# Patient Record
Sex: MALE | Race: ASIAN | NOT HISPANIC OR LATINO | URBAN - METROPOLITAN AREA
[De-identification: names, ages, dates, MRNs, and addresses within clinical notes are randomized per-mention and may not be internally consistent; named-entity substitution may affect disease eponyms.]

---

## 2022-01-05 ENCOUNTER — EMERGENCY (EMERGENCY)
Facility: HOSPITAL | Age: 62
LOS: 1 days | Discharge: HOME | End: 2022-01-05
Attending: EMERGENCY MEDICINE
Payer: COMMERCIAL

## 2022-01-05 VITALS
WEIGHT: 173.06 LBS | HEART RATE: 98 BPM | RESPIRATION RATE: 19 BRPM | SYSTOLIC BLOOD PRESSURE: 162 MMHG | OXYGEN SATURATION: 98 % | TEMPERATURE: 98 F | HEIGHT: 70 IN | DIASTOLIC BLOOD PRESSURE: 92 MMHG

## 2022-01-05 VITALS
OXYGEN SATURATION: 97 % | DIASTOLIC BLOOD PRESSURE: 76 MMHG | TEMPERATURE: 99 F | HEART RATE: 96 BPM | SYSTOLIC BLOOD PRESSURE: 122 MMHG | RESPIRATION RATE: 17 BRPM

## 2022-01-05 LAB
ALBUMIN SERPL ELPH-MCNC: 4.2 G/DL — SIGNIFICANT CHANGE UP (ref 3.5–5)
ALP SERPL-CCNC: 60 U/L — SIGNIFICANT CHANGE UP (ref 40–120)
ALT FLD-CCNC: 27 U/L DA — SIGNIFICANT CHANGE UP (ref 10–60)
ANION GAP SERPL CALC-SCNC: 6 MMOL/L — SIGNIFICANT CHANGE UP (ref 5–17)
APPEARANCE UR: CLEAR — SIGNIFICANT CHANGE UP
APTT BLD: 40 SEC — HIGH (ref 27.5–35.5)
AST SERPL-CCNC: 19 U/L — SIGNIFICANT CHANGE UP (ref 10–40)
BASOPHILS # BLD AUTO: 0.04 K/UL — SIGNIFICANT CHANGE UP (ref 0–0.2)
BASOPHILS NFR BLD AUTO: 0.3 % — SIGNIFICANT CHANGE UP (ref 0–2)
BILIRUB SERPL-MCNC: 0.6 MG/DL — SIGNIFICANT CHANGE UP (ref 0.2–1.2)
BILIRUB UR-MCNC: NEGATIVE — SIGNIFICANT CHANGE UP
BUN SERPL-MCNC: 14 MG/DL — SIGNIFICANT CHANGE UP (ref 7–18)
CALCIUM SERPL-MCNC: 8.9 MG/DL — SIGNIFICANT CHANGE UP (ref 8.4–10.5)
CHLORIDE SERPL-SCNC: 104 MMOL/L — SIGNIFICANT CHANGE UP (ref 96–108)
CO2 SERPL-SCNC: 29 MMOL/L — SIGNIFICANT CHANGE UP (ref 22–31)
COLOR SPEC: YELLOW — SIGNIFICANT CHANGE UP
CREAT SERPL-MCNC: 0.92 MG/DL — SIGNIFICANT CHANGE UP (ref 0.5–1.3)
DIFF PNL FLD: ABNORMAL
EOSINOPHIL # BLD AUTO: 0.01 K/UL — SIGNIFICANT CHANGE UP (ref 0–0.5)
EOSINOPHIL NFR BLD AUTO: 0.1 % — SIGNIFICANT CHANGE UP (ref 0–6)
GLUCOSE SERPL-MCNC: 135 MG/DL — HIGH (ref 70–99)
GLUCOSE UR QL: NEGATIVE — SIGNIFICANT CHANGE UP
HCT VFR BLD CALC: 47.1 % — SIGNIFICANT CHANGE UP (ref 39–50)
HGB BLD-MCNC: 15.3 G/DL — SIGNIFICANT CHANGE UP (ref 13–17)
IMM GRANULOCYTES NFR BLD AUTO: 0.4 % — SIGNIFICANT CHANGE UP (ref 0–1.5)
INR BLD: 1.81 RATIO — HIGH (ref 0.88–1.16)
KETONES UR-MCNC: NEGATIVE — SIGNIFICANT CHANGE UP
LACTATE SERPL-SCNC: 2 MMOL/L — SIGNIFICANT CHANGE UP (ref 0.7–2)
LEUKOCYTE ESTERASE UR-ACNC: NEGATIVE — SIGNIFICANT CHANGE UP
LIDOCAIN IGE QN: 74 U/L — SIGNIFICANT CHANGE UP (ref 73–393)
LYMPHOCYTES # BLD AUTO: 0.56 K/UL — LOW (ref 1–3.3)
LYMPHOCYTES # BLD AUTO: 4 % — LOW (ref 13–44)
MCHC RBC-ENTMCNC: 32.5 GM/DL — SIGNIFICANT CHANGE UP (ref 32–36)
MCHC RBC-ENTMCNC: 32.6 PG — SIGNIFICANT CHANGE UP (ref 27–34)
MCV RBC AUTO: 100.4 FL — HIGH (ref 80–100)
MONOCYTES # BLD AUTO: 0.51 K/UL — SIGNIFICANT CHANGE UP (ref 0–0.9)
MONOCYTES NFR BLD AUTO: 3.7 % — SIGNIFICANT CHANGE UP (ref 2–14)
NEUTROPHILS # BLD AUTO: 12.76 K/UL — HIGH (ref 1.8–7.4)
NEUTROPHILS NFR BLD AUTO: 91.5 % — HIGH (ref 43–77)
NITRITE UR-MCNC: NEGATIVE — SIGNIFICANT CHANGE UP
NRBC # BLD: 0 /100 WBCS — SIGNIFICANT CHANGE UP (ref 0–0)
PH UR: 5 — SIGNIFICANT CHANGE UP (ref 5–8)
PLATELET # BLD AUTO: 257 K/UL — SIGNIFICANT CHANGE UP (ref 150–400)
POTASSIUM SERPL-MCNC: 3.7 MMOL/L — SIGNIFICANT CHANGE UP (ref 3.5–5.3)
POTASSIUM SERPL-SCNC: 3.7 MMOL/L — SIGNIFICANT CHANGE UP (ref 3.5–5.3)
PROT SERPL-MCNC: 7.9 G/DL — SIGNIFICANT CHANGE UP (ref 6–8.3)
PROT UR-MCNC: 30 MG/DL
PROTHROM AB SERPL-ACNC: 20.9 SEC — HIGH (ref 10.6–13.6)
RBC # BLD: 4.69 M/UL — SIGNIFICANT CHANGE UP (ref 4.2–5.8)
RBC # FLD: 11.3 % — SIGNIFICANT CHANGE UP (ref 10.3–14.5)
SARS-COV-2 RNA SPEC QL NAA+PROBE: SIGNIFICANT CHANGE UP
SODIUM SERPL-SCNC: 139 MMOL/L — SIGNIFICANT CHANGE UP (ref 135–145)
SP GR SPEC: 1.03 — HIGH (ref 1.01–1.02)
UROBILINOGEN FLD QL: NEGATIVE — SIGNIFICANT CHANGE UP
WBC # BLD: 13.94 K/UL — HIGH (ref 3.8–10.5)
WBC # FLD AUTO: 13.94 K/UL — HIGH (ref 3.8–10.5)

## 2022-01-05 PROCEDURE — 74177 CT ABD & PELVIS W/CONTRAST: CPT | Mod: 26,MA

## 2022-01-05 PROCEDURE — 36415 COLL VENOUS BLD VENIPUNCTURE: CPT

## 2022-01-05 PROCEDURE — 83605 ASSAY OF LACTIC ACID: CPT

## 2022-01-05 PROCEDURE — 99285 EMERGENCY DEPT VISIT HI MDM: CPT

## 2022-01-05 PROCEDURE — 81001 URINALYSIS AUTO W/SCOPE: CPT

## 2022-01-05 PROCEDURE — 85610 PROTHROMBIN TIME: CPT

## 2022-01-05 PROCEDURE — 74177 CT ABD & PELVIS W/CONTRAST: CPT | Mod: MA

## 2022-01-05 PROCEDURE — 93010 ELECTROCARDIOGRAM REPORT: CPT

## 2022-01-05 PROCEDURE — 87635 SARS-COV-2 COVID-19 AMP PRB: CPT

## 2022-01-05 PROCEDURE — 96374 THER/PROPH/DIAG INJ IV PUSH: CPT | Mod: XU

## 2022-01-05 PROCEDURE — 99284 EMERGENCY DEPT VISIT MOD MDM: CPT | Mod: 25

## 2022-01-05 PROCEDURE — 74019 RADEX ABDOMEN 2 VIEWS: CPT

## 2022-01-05 PROCEDURE — 85730 THROMBOPLASTIN TIME PARTIAL: CPT

## 2022-01-05 PROCEDURE — 83690 ASSAY OF LIPASE: CPT

## 2022-01-05 PROCEDURE — 85025 COMPLETE CBC W/AUTO DIFF WBC: CPT

## 2022-01-05 PROCEDURE — 74019 RADEX ABDOMEN 2 VIEWS: CPT | Mod: 26

## 2022-01-05 PROCEDURE — 80053 COMPREHEN METABOLIC PANEL: CPT

## 2022-01-05 PROCEDURE — 87086 URINE CULTURE/COLONY COUNT: CPT

## 2022-01-05 PROCEDURE — 93005 ELECTROCARDIOGRAM TRACING: CPT

## 2022-01-05 RX ORDER — SODIUM CHLORIDE 9 MG/ML
1000 INJECTION INTRAMUSCULAR; INTRAVENOUS; SUBCUTANEOUS ONCE
Refills: 0 | Status: COMPLETED | OUTPATIENT
Start: 2022-01-05 | End: 2022-01-05

## 2022-01-05 RX ORDER — MORPHINE SULFATE 50 MG/1
2 CAPSULE, EXTENDED RELEASE ORAL ONCE
Refills: 0 | Status: DISCONTINUED | OUTPATIENT
Start: 2022-01-05 | End: 2022-01-05

## 2022-01-05 RX ADMIN — MORPHINE SULFATE 2 MILLIGRAM(S): 50 CAPSULE, EXTENDED RELEASE ORAL at 05:52

## 2022-01-05 RX ADMIN — SODIUM CHLORIDE 1000 MILLILITER(S): 9 INJECTION INTRAMUSCULAR; INTRAVENOUS; SUBCUTANEOUS at 05:52

## 2022-01-05 NOTE — CONSULT NOTE ADULT - ASSESSMENT
61M with AF (on Multaq, Atenolol and Coumadin), with diffusely dilated fluid filled loops of bowel with multiple transition points on non-con CT  pt denies N/V and is passing flatus this AM; no prior surgeries; on physical examination soft, nontender, no guarding, no rebound  CT scan limited value given without oral contrast    - No surgical intervention warranted during this time given clinically low suspicion of SBO  - Consider CT scan with oral contrast for better evaluation of small bowel  - Recommend GI consult  - Discussed with Dr. Abreu, agrees

## 2022-01-05 NOTE — ED ADULT NURSE NOTE - CHIEF COMPLAINT
Stage 14: Additional Anesthesia Type: 1% lidocaine with epinephrine The patient is a 61y Male complaining of abdominal pain.

## 2022-01-05 NOTE — ED PROVIDER NOTE - NSFOLLOWUPINSTRUCTIONS_ED_ALL_ED_FT
You were seen in the emergency department for: abdominal pain  Your results report is attached. Please follow up in GI clinic with Dr. Spain tomorrow morning - call [phone] - the clinic is located at [address] [place] [address].    You may be contacted by the Emergency Department Referrals Coordinator to set up your follow-up appointment within 24-48 hours of your discharge, Monday to Friday. We recommend you follow up with: Gastroenterology    Please return to the Emergency Department if you experience any of the following symptoms:   - Shortness of breath or trouble breathing  - Pressure, pain or tightness in the chest  - Face drooping, arm weakness or speech difficulty  - Persistence of severe vomiting  - Head injury or loss of consciousness  - Nonstop bleeding or an open wound    (1) Follow up with your primary care physician within the next 24-48 hours as discussed. In addition, we did not find evidence of a life threatening illness on your testing here today, but listed below are the specialists that will be necessary to see as an outpatient to continue the workup.  Please call the numbers listed below or [phone] to set up the necessary appointments.  (2) Take Tylenol (up to 1000mg or 1 g)  and/or Motrin (up to 600mg) up to every 6 hours as needed for pain.   (3) If you had an IV (intravenous) line placed, it was removed. Sometimes, after IV removal, that area can be tender for a few days; if it develops redness and swelling, those could be signs of infection; in which case, return to the Emergency Department for assessment.  (4) Please continue taking all of your home medications as directed.

## 2022-01-05 NOTE — ED PROVIDER NOTE - CLINICAL SUMMARY MEDICAL DECISION MAKING FREE TEXT BOX
60yo M with hx Afib on coumadin, HTN, BPH presents with abdominal pain. Will obtain ekg, labs, CT A/P. Given IVF and morphine. Will reassess.  patient signedout to Dr. Howe at 730am.

## 2022-01-05 NOTE — ED PROVIDER NOTE - PATIENT PORTAL LINK FT
You can access the FollowMyHealth Patient Portal offered by [facility] by registering at the following website: [email or web address]. By joining [employer]’s FollowMyHealth portal, you will also be able to view your health information using other applications (apps) compatible with our system.

## 2022-01-05 NOTE — CONSULT NOTE ADULT - SUBJECTIVE AND OBJECTIVE BOX
GENERAL SURGERY CONSULTATION NOTE  Patient is a 61y old  Male who presents with a chief complaint of abdominal pain    HPI:  61M with PMHx AFib (on Coumadin), angina s/p 5 cardiac stents (follows Dr. Gamaliel Swartz in [facility] cardiology), on Multaq, Coumadin and Atenolol presented to the hospital c/o severe diffuse abdominal pain that started last night at 8pm approximately 30 minutes after having dinner. States nobody else who ate the same dinner got sick or had any pain. Pt reports he waited for the pain to go away on its own but reports it persisted which prompted his visit to the ED early this morning. Reports he received pain medication in the ED which helped relieve the pain. Reports passing flatus this morning. Denies ever having nausea, vomiting since the beginning of his pain. Last BM 2pm yesterday, WNL; usually has a BM every day. No prior colonoscopy.       REVIEW OF SYSTEMS:  Negative except stated above in HPI    PAST MEDICAL & SURGICAL HISTORY:  cardiac stents  AFib  HTN  HLD  BPH    Allergies: No Known Allergies    Vital Signs Last 24 Hrs  T(C): 36.8 (05 Jan 2022 08:18), Max: 36.9 (05 Jan 2022 04:47)  T(F): 98.2 (05 Jan 2022 08:18), Max: 98.4 (05 Jan 2022 04:47)  HR: 114 (05 Jan 2022 08:18) (98 - 114)  BP: 148/91 (05 Jan 2022 08:18) (148/91 - 162/92)  BP(mean): --  RR: 17 (05 Jan 2022 08:18) (17 - 19)  SpO2: 97% (05 Jan 2022 08:18) (97% - 98%)    PHYSICAL EXAM:   General: Alert and oriented, not in acute distress  Respiratory: Breathing unlabored  GI: soft, nondistended, nontender, no scars; no palpable hernias  : no dysuria or hematuria  Extremities: ZAVALA  Skin: warm, dry    LABS:                      15.3   13.94 )-----------( 257      ( 05 Jan 2022 05:50 )             47.1              01-05    139  |  104  |  14  ----------------------------<  135<H>  3.7   |  29  |  0.92    Ca    8.9      05 Jan 2022 05:50    TPro  7.9  /  Alb  4.2  /  TBili  0.6  /  DBili  x   /  AST  19  /  ALT  27  /  AlkPhos  60  01-05  PT/INR - ( 05 Jan 2022 05:50 )   PT: 20.9 sec;   INR: 1.81 ratio    PTT - ( 05 Jan 2022 05:50 )  PTT:40.0 sec    Urinalysis Basic - ( 05 Jan 2022 05:51 )  Color: x / Appearance: x / SG: x / pH: x  Gluc: x / Ketone: x  / Bili: x / Urobili: x   Blood: x / Protein: x / Nitrite: x   Leuk Esterase: x / RBC: 10-25 /HPF / WBC 0-2 /HPF   Sq Epi: x / Non Sq Epi: Few /HPF / Bacteria: Few /HPF    RADIOLOGY & ADDITIONAL STUDIES:  < from: CT Abdomen and Pelvis w/ IV Cont (01.05.22 @ 07:26) >  PROCEDURE DATE:  01/05/2022    INTERPRETATION:  CLINICAL INFORMATION:  diffuse abdominal pain  COMPARISON: None.  CONTRAST/COMPLICATIONS:  IV Contrast: Omnipaque 350  90 cc administered   10 cc discarded  Oral Contrast: NONE  Complications: None reported at time of study completion  PROCEDURE:  Axial CT images were acquired through the abdomen and pelvis following   intravenous contrast. Coronal and sagittal reformatted images provided..  FINDINGS:  LOWER CHEST: Coronary artery calcifications. Lung bases are grossly clear.  LIVER: Within normal limits.  BILE DUCTS: Normal caliber.  GALLBLADDER: No calcified gallstone..  SPLEEN: Within normal limits.  PANCREAS: Within normal limits.  ADRENALS: Within normal limits.  KIDNEYS/URETERS: Kidneys enhance symmetrically without hydronephrosis.   The ureters are not dilated.  BLADDER: Collapsed.  REPRODUCTIVE ORGANS: Prostate gland is mildly enlarged containscentral   calcifications. Seminal vesicles are symmetric.    BOWEL: Evaluation of bowel is limited without distention with oral   contrast. There are multiple prominently distended and mildly dilated   loops of small bowel. There is a zone of transition within the left   quadrant 2:103-106, another zone of transition within the left upper   pelvis 2:133-135 and an additional zone of transition in the anterior   abdomen 2:94-97. Possible additional zone of transition in the right   lower quadrant with possible mild thickening of small bowel loops,   2:108-116. Stomach is underdistended for adequate evaluation, however   suggestion of a small hiatal hernia. Normal appendix without appendicitis.  PERITONEUM: No free air.. Small amount of pelvic free fluid.  VESSELS:  There is no abdominal aortic aneurysm or dissection.  RETROPERITONEUM: There is mesenteric fatty stranding/edema in the lower   abdomen.  ABDOMINAL WALL: Within normal limits.  BONES: Degenerative changes of the spine are seen..    IMPRESSION:  Multiple prominently distended and mildly dilated loops of small bowel   with several transition zones as above. There are associated   congestive/inflammatory changes of the mesentery and small amount of   pelvic free fluid. Findings suggest small bowel obstruction and possible   underlying areas of mild small bowel wall thickening.  --- End of Report ---  EZEKIEL PIPER MD; Attending Radiologist  This document has been electronically signed. Jan 5 2022  8:40AM  < end of copied text >

## 2022-01-05 NOTE — ED PROVIDER NOTE - PROGRESS NOTE DETAILS
Received signout from Dr. Wilcox with plan to follow up CT scan.   CTAP: Multiple prominently distended and mildly dilated loops of small bowel   with several transition zones as above. There are associated   congestive/inflammatory changes of the mesentery and small amount of   pelvic free fluid. Findings suggest small bowel obstruction and possible   underlying areas of mild small bowel wall thickening.    Seen by Surgery - clinically they do not believe this is an SBO. Received signout from Dr. Wilcox with plan to follow up CT scan.   CTAP: Multiple prominently distended and mildly dilated loops of small bowel   with several transition zones as above. There are associated   congestive/inflammatory changes of the mesentery and small amount of   pelvic free fluid. Findings suggest small bowel obstruction and possible   underlying areas of mild small bowel wall thickening.    Seen by Surgery - clinically they do not believe this is an SBO. Recommended GI consult. Spoke to GI Dr. Spain- recommended abdominal xray now (5 hours after CTAP) to assess for changes.  They stated if the XR appears same or improved from CT, patient can be discharged with follow-up tomorrow with Dr. Spain.  Abd xray performed demonstrating thickened small bowel loops in LUQ, no change from CT. Will discharge.

## 2022-01-05 NOTE — ED PROVIDER NOTE - OBJECTIVE STATEMENT
60yo M with hx Afib on coumadin, HTN, BPH presents with abdominal pain. Reports onset at 8pm last night over whole abdomen. Denies fever, cough, vomiting, diarrhea, dysuria/hematuria. Reports last BM yesterday 2pm. Denies hx abd surgeries in past.

## 2022-01-06 LAB
CULTURE RESULTS: SIGNIFICANT CHANGE UP
SPECIMEN SOURCE: SIGNIFICANT CHANGE UP

## 2023-06-08 VITALS
TEMPERATURE: 98 F | RESPIRATION RATE: 18 BRPM | HEART RATE: 91 BPM | WEIGHT: 169.98 LBS | SYSTOLIC BLOOD PRESSURE: 133 MMHG | OXYGEN SATURATION: 97 % | HEIGHT: 70 IN | DIASTOLIC BLOOD PRESSURE: 70 MMHG

## 2023-06-08 RX ORDER — CHLORHEXIDINE GLUCONATE 213 G/1000ML
1 SOLUTION TOPICAL ONCE
Refills: 0 | Status: DISCONTINUED | OUTPATIENT
Start: 2023-06-14 | End: 2023-06-28

## 2023-06-08 NOTE — H&P ADULT - HISTORY OF PRESENT ILLNESS
Cardiologist: Dr. Gamaliel Swartz   Pharmacy: ______  Escort: ____    64 y/o male, ____ speaking, w/ PMHx HLD, CAD (s/p prior PCI's to LAD, mid LCx, OM, and RCA), Paroxysmal A-fib (on __Coumadin vs Xarelto___, last dose ____), known carotid bruit (w/ mild LICA < 20%), and BPH who presented to outpatient cardiologist, Dr. Swartz, endorsing substernal, intermittent, gradually worsening chest pressure that was non-radiating, experienced w/ ambulating less than 2 blocks or less than 1 flight of stairs, and relieved w/ rest. Patient also had previously endorsed dizziness without apparent aggrevating/allievating factors. Patient also admitted to bilateral calf and foot discomfort. Patient denied any diaphoresis, dizziness, syncope, palpitations, SOB/WILLAMS, abdominal pain, nausea/vomiting, melena, LE edema, fever, chills, and cough. Echocardiogram (05/22/2023) showed EF 55%, mildly dilated LA, abnormal tissue doppler index suggestive of LV diastolic dysfunction, mild MR, trace to mild TR, no pericardial effusion, no pleural effusion, and no pulmonary hypertension. Nuclear Stress Test (05/24/2023) revealed small perfusion abnormality of moderate intensity in the inferior region reversible in the rest images, post-stress EF 48% with normal myocardial thickening and wall motion.     In light of risk factors, CCS Class III anginal symptoms, and abnormal stress test results, patient now presents for cardiac catheterization with possible intervention if clinically indicated. Cardiologist: Dr. Gamaliel Swartz   Pharmacy: [facility] [address] [place] [address]  Escort: Wife    64 y/o male, with a PMHx HLD, CAD (s/p prior PCI's to LAD, mid LCx, OM, and RCA-most recent @ [facility] in 2013), Paroxysmal A-fib (on Coumadin-last dose Saturday PM and Multaq BID), known carotid bruit (w/ mild LICA < 20%), and BPH who presented to outpatient cardiologist, Dr. Swartz, endorsing substernal, intermittent, gradually worsening chest pressure that was non-radiating, experienced w/ ambulating less than 2 blocks or less than 1 flight of stairs, and relieved w/ rest. Patient also had previously endorsed dizziness without apparent aggravating / alleviating factors. Patient also admitted to bilateral calf and foot discomfort. Patient denied any diaphoresis, dizziness, syncope, palpitations, SOB/WILLAMS, abdominal pain, nausea/vomiting, melena, LE edema, fever, chills, and cough. Echocardiogram (05/22/2023) showed EF 55%, mildly dilated LA, abnormal tissue doppler index suggestive of LV diastolic dysfunction, mild MR, trace to mild TR, no pericardial effusion, no pleural effusion, and no pulmonary hypertension. Nuclear Stress Test (05/24/2023) revealed small perfusion abnormality of moderate intensity in the inferior region reversible in the rest images, post-stress EF 48% with normal myocardial thickening and wall motion.     In light of risk factors, CCS Class III anginal symptoms, known CAD and abnormal stress test results, patient now presents for cardiac catheterization with possible intervention if clinically indicated.

## 2023-06-08 NOTE — H&P ADULT - ASSESSMENT
64 y/o male, with a PMHx HLD, CAD (s/p prior PCI's to LAD, mid LCx, OM, and RCA-most recent @ [facility] in 2013), Paroxysmal A-fib (on Coumadin-last dose Saturday PM and Multaq BID), known carotid bruit (w/ mild LICA < 20%), and BPH now presents for cardiac catheterization with possible intervention if clinically indicated due to patients risk factors, CCS Class III anginal symptoms, known CAD and abnormal stress test.     -H/H:  14.2/43.6 Pt denies BRBPR, hematuria, hematochezia, melena. Pt took his ASSA 81 mg x one dose this AM. Will load with Plavix 600 mg daily. INR stable at 1.25 this AM-last dose of Coumadin Saturday 6/10/2023  -Cr: 1.04 EF normal. Euvolemic on exam.   bolus x one given per protocol followed by NS @ 75 cc/hour x two hours   -Cardiac Catheterization ordered placed   -Home Medication Confirmed via: List at bedside  -Type of sedation: Moderate  -Candidate for sedation: Yes     Risks & benefits of procedure and alternative therapy have been explained to the patient including but not limited to: allergic reaction, bleeding w/possible need for blood transfusion, infection, renal and vascular compromise, limb damage, arrhythmia, stroke, vessel dissection/perforation, Myocardial infarction, emergent CABG. Informed consent obtained and in chart.   62 y/o male, with a PMHx HLD, CAD (s/p prior PCI's to LAD, mid LCx, OM, and RCA-most recent @ [facility] in 2013), Paroxysmal A-fib (on Coumadin-last dose Saturday PM and Multaq BID), known carotid bruit (w/ mild LICA < 20%), and BPH now presents for cardiac catheterization with possible intervention if clinically indicated due to patients risk factors, CCS Class III anginal symptoms, known CAD and abnormal stress test.     -H/H:  14.2/43.6 Pt denies BRBPR, hematuria, hematochezia, melena. Pt took his ASA 81 mg x one dose this AM. Will load with Plavix 600 mg daily. INR stable at 1.25 this AM-last dose of Coumadin Saturday 6/10/2023  -Cr: 1.04,  EF normal. Euvolemic on exam.   bolus x one given per protocol followed by NS @ 75 cc/hour x two hours   -Cardiac Catheterization ordered placed   -Home Medication Confirmed via: List at bedside  -Type of sedation: Moderate  -Candidate for sedation: Yes     Risks & benefits of procedure and alternative therapy have been explained to the patient including but not limited to: allergic reaction, bleeding w/possible need for blood transfusion, infection, renal and vascular compromise, limb damage, arrhythmia, stroke, vessel dissection/perforation, Myocardial infarction, emergent CABG. Informed consent obtained and in chart.

## 2023-06-08 NOTE — H&P ADULT - NSICDXPASTMEDICALHX_GEN_ALL_CORE_FT
PAST MEDICAL HISTORY:  BPH (benign prostatic hyperplasia)     CAD (coronary artery disease)     Hyperlipidemia     Left carotid bruit     Paroxysmal atrial fibrillation

## 2023-06-14 ENCOUNTER — OUTPATIENT (OUTPATIENT)
Dept: OUTPATIENT SERVICES | Facility: HOSPITAL | Age: 63
LOS: 1 days | Discharge: HOME | End: 2023-06-14
Payer: COMMERCIAL

## 2023-06-14 DIAGNOSIS — Z95.5 PRESENCE OF CORONARY ANGIOPLASTY IMPLANT AND GRAFT: Chronic | ICD-10-CM

## 2023-06-14 LAB
A1C WITH ESTIMATED AVERAGE GLUCOSE RESULT: 4.7 % — SIGNIFICANT CHANGE UP (ref 4–5.6)
ALBUMIN SERPL ELPH-MCNC: 4.4 G/DL — SIGNIFICANT CHANGE UP (ref 3.3–5)
ALP SERPL-CCNC: 54 U/L — SIGNIFICANT CHANGE UP (ref 40–120)
ALT FLD-CCNC: 18 U/L — SIGNIFICANT CHANGE UP (ref 10–45)
ANION GAP SERPL CALC-SCNC: 8 MMOL/L — SIGNIFICANT CHANGE UP (ref 5–17)
APTT BLD: 35.8 SEC — HIGH (ref 27.5–35.5)
AST SERPL-CCNC: 19 U/L — SIGNIFICANT CHANGE UP (ref 10–40)
BASOPHILS # BLD AUTO: 0.03 K/UL — SIGNIFICANT CHANGE UP (ref 0–0.2)
BASOPHILS NFR BLD AUTO: 0.5 % — SIGNIFICANT CHANGE UP (ref 0–2)
BILIRUB SERPL-MCNC: 1.2 MG/DL — SIGNIFICANT CHANGE UP (ref 0.2–1.2)
BUN SERPL-MCNC: 10 MG/DL — SIGNIFICANT CHANGE UP (ref 7–23)
CALCIUM SERPL-MCNC: 9 MG/DL — SIGNIFICANT CHANGE UP (ref 8.4–10.5)
CHLORIDE SERPL-SCNC: 103 MMOL/L — SIGNIFICANT CHANGE UP (ref 96–108)
CHOLEST SERPL-MCNC: 167 MG/DL — SIGNIFICANT CHANGE UP
CK MB CFR SERPL CALC: 2.6 NG/ML — SIGNIFICANT CHANGE UP (ref 0–6.7)
CK SERPL-CCNC: 168 U/L — SIGNIFICANT CHANGE UP (ref 30–200)
CO2 SERPL-SCNC: 27 MMOL/L — SIGNIFICANT CHANGE UP (ref 22–31)
CREAT SERPL-MCNC: 1.04 MG/DL — SIGNIFICANT CHANGE UP (ref 0.5–1.3)
EGFR: 81 ML/MIN/1.73M2 — SIGNIFICANT CHANGE UP
EOSINOPHIL # BLD AUTO: 0.13 K/UL — SIGNIFICANT CHANGE UP (ref 0–0.5)
EOSINOPHIL NFR BLD AUTO: 2.4 % — SIGNIFICANT CHANGE UP (ref 0–6)
ESTIMATED AVERAGE GLUCOSE: 88 MG/DL — SIGNIFICANT CHANGE UP (ref 68–114)
GLUCOSE SERPL-MCNC: 99 MG/DL — SIGNIFICANT CHANGE UP (ref 70–99)
HCT VFR BLD CALC: 43.6 % — SIGNIFICANT CHANGE UP (ref 39–50)
HDLC SERPL-MCNC: 67 MG/DL — SIGNIFICANT CHANGE UP
HGB BLD-MCNC: 14.2 G/DL — SIGNIFICANT CHANGE UP (ref 13–17)
IMM GRANULOCYTES NFR BLD AUTO: 0.2 % — SIGNIFICANT CHANGE UP (ref 0–0.9)
INR BLD: 1.25 — HIGH (ref 0.88–1.16)
LIPID PNL WITH DIRECT LDL SERPL: 80 MG/DL — SIGNIFICANT CHANGE UP
LYMPHOCYTES # BLD AUTO: 1.03 K/UL — SIGNIFICANT CHANGE UP (ref 1–3.3)
LYMPHOCYTES # BLD AUTO: 18.7 % — SIGNIFICANT CHANGE UP (ref 13–44)
MAGNESIUM SERPL-MCNC: 2.2 MG/DL — SIGNIFICANT CHANGE UP (ref 1.6–2.6)
MCHC RBC-ENTMCNC: 32.6 GM/DL — SIGNIFICANT CHANGE UP (ref 32–36)
MCHC RBC-ENTMCNC: 33.7 PG — SIGNIFICANT CHANGE UP (ref 27–34)
MCV RBC AUTO: 103.6 FL — HIGH (ref 80–100)
MONOCYTES # BLD AUTO: 0.4 K/UL — SIGNIFICANT CHANGE UP (ref 0–0.9)
MONOCYTES NFR BLD AUTO: 7.3 % — SIGNIFICANT CHANGE UP (ref 2–14)
NEUTROPHILS # BLD AUTO: 3.9 K/UL — SIGNIFICANT CHANGE UP (ref 1.8–7.4)
NEUTROPHILS NFR BLD AUTO: 70.9 % — SIGNIFICANT CHANGE UP (ref 43–77)
NON HDL CHOLESTEROL: 100 MG/DL — SIGNIFICANT CHANGE UP
NRBC # BLD: 0 /100 WBCS — SIGNIFICANT CHANGE UP (ref 0–0)
PLATELET # BLD AUTO: 198 K/UL — SIGNIFICANT CHANGE UP (ref 150–400)
POTASSIUM SERPL-MCNC: 4.1 MMOL/L — SIGNIFICANT CHANGE UP (ref 3.5–5.3)
POTASSIUM SERPL-SCNC: 4.1 MMOL/L — SIGNIFICANT CHANGE UP (ref 3.5–5.3)
PROT SERPL-MCNC: 7.3 G/DL — SIGNIFICANT CHANGE UP (ref 6–8.3)
PROTHROM AB SERPL-ACNC: 14.9 SEC — HIGH (ref 10.5–13.4)
RBC # BLD: 4.21 M/UL — SIGNIFICANT CHANGE UP (ref 4.2–5.8)
RBC # FLD: 11.8 % — SIGNIFICANT CHANGE UP (ref 10.3–14.5)
SODIUM SERPL-SCNC: 138 MMOL/L — SIGNIFICANT CHANGE UP (ref 135–145)
TRIGL SERPL-MCNC: 100 MG/DL — SIGNIFICANT CHANGE UP
WBC # BLD: 5.5 K/UL — SIGNIFICANT CHANGE UP (ref 3.8–10.5)
WBC # FLD AUTO: 5.5 K/UL — SIGNIFICANT CHANGE UP (ref 3.8–10.5)

## 2023-06-14 PROCEDURE — 99152 MOD SED SAME PHYS/QHP 5/>YRS: CPT

## 2023-06-14 PROCEDURE — 93458 L HRT ARTERY/VENTRICLE ANGIO: CPT | Mod: 26

## 2023-06-14 PROCEDURE — 93010 ELECTROCARDIOGRAM REPORT: CPT

## 2023-06-14 RX ORDER — SODIUM CHLORIDE 9 MG/ML
500 INJECTION INTRAMUSCULAR; INTRAVENOUS; SUBCUTANEOUS
Refills: 0 | Status: DISCONTINUED | OUTPATIENT
Start: 2023-06-14 | End: 2023-06-28

## 2023-06-14 RX ORDER — NITROGLYCERIN 6.5 MG
1 CAPSULE, EXTENDED RELEASE ORAL
Refills: 0 | DISCHARGE

## 2023-06-14 RX ORDER — CLOPIDOGREL BISULFATE 75 MG/1
600 TABLET, FILM COATED ORAL ONCE
Refills: 0 | Status: COMPLETED | OUTPATIENT
Start: 2023-06-14 | End: 2023-06-14

## 2023-06-14 RX ORDER — ATENOLOL 25 MG/1
1 TABLET ORAL
Refills: 0 | DISCHARGE

## 2023-06-14 RX ORDER — WARFARIN SODIUM 2.5 MG/1
1 TABLET ORAL
Refills: 0 | DISCHARGE

## 2023-06-14 RX ORDER — SODIUM CHLORIDE 9 MG/ML
250 INJECTION INTRAMUSCULAR; INTRAVENOUS; SUBCUTANEOUS ONCE
Refills: 0 | Status: COMPLETED | OUTPATIENT
Start: 2023-06-14 | End: 2023-06-14

## 2023-06-14 RX ORDER — WARFARIN SODIUM 2.5 MG/1
1.5 TABLET ORAL
Refills: 0 | DISCHARGE

## 2023-06-14 RX ORDER — ASPIRIN/CALCIUM CARB/MAGNESIUM 324 MG
1 TABLET ORAL
Refills: 0 | DISCHARGE

## 2023-06-14 RX ORDER — DRONEDARONE 400 MG/1
1 TABLET, FILM COATED ORAL
Refills: 0 | DISCHARGE

## 2023-06-14 RX ORDER — SIMVASTATIN 20 MG/1
1 TABLET, FILM COATED ORAL
Refills: 0 | DISCHARGE

## 2023-06-14 RX ORDER — ATENOLOL 25 MG/1
1 TABLET ORAL
Qty: 30 | Refills: 2
Start: 2023-06-14

## 2023-06-14 RX ORDER — TAMSULOSIN HYDROCHLORIDE 0.4 MG/1
1 CAPSULE ORAL
Refills: 0 | DISCHARGE

## 2023-06-14 RX ADMIN — CLOPIDOGREL BISULFATE 600 MILLIGRAM(S): 75 TABLET, FILM COATED ORAL at 10:29

## 2023-06-14 RX ADMIN — SODIUM CHLORIDE 75 MILLILITER(S): 9 INJECTION INTRAMUSCULAR; INTRAVENOUS; SUBCUTANEOUS at 10:28

## 2023-06-14 RX ADMIN — SODIUM CHLORIDE 500 MILLILITER(S): 9 INJECTION INTRAMUSCULAR; INTRAVENOUS; SUBCUTANEOUS at 10:28

## 2023-06-14 NOTE — PROGRESS NOTE ADULT - SUBJECTIVE AND OBJECTIVE BOX
Interventional Cardiology PA SDA Discharge Note    Patient without complaints. Ambulated and voided without difficulties.     Afebrile, VSS.    Ext: Right Groin: No hematoma or bruit, dressing; C/D/I    Pulses:  intact RAD/DP/PT to baseline.     s/p cardiac catheterization 06/14/23: pRCA 30%, dRCA patent stent, LCx patent stent, pLAD patent stent, LM wnl. Medical therapy advised.  LVEDP 1 mm hg. Intraop noted to become hypotensive in which IVF bolus and neosynephrine bolus given per IC fellow. BP currently stable at 100/67. Pt asymptomatic and feeling well.       1.	Stable for discharge as per attending Dr. Swartz after bed rest, pt voids, groin/wrist stable and 30 minutes of ambulation.  2.	Follow-up with PMD/Cardiologist in 1 week  3.	Discharged forms signed and copies in chart   4.         Recommend lowering atenolol dose to 25 mg po daily. Monitor BP at home daily prior to taking medication and instructed pt when to avoid taking if BP soft.    5.         Prescription sent over to pharmacy for atenolol 25 mg po daily.

## 2023-06-15 PROCEDURE — 85610 PROTHROMBIN TIME: CPT

## 2023-06-15 PROCEDURE — 83036 HEMOGLOBIN GLYCOSYLATED A1C: CPT

## 2023-06-15 PROCEDURE — C1887: CPT

## 2023-06-15 PROCEDURE — 85025 COMPLETE CBC W/AUTO DIFF WBC: CPT

## 2023-06-15 PROCEDURE — 82550 ASSAY OF CK (CPK): CPT

## 2023-06-15 PROCEDURE — C1769: CPT

## 2023-06-15 PROCEDURE — 36415 COLL VENOUS BLD VENIPUNCTURE: CPT

## 2023-06-15 PROCEDURE — 99152 MOD SED SAME PHYS/QHP 5/>YRS: CPT

## 2023-06-15 PROCEDURE — 83735 ASSAY OF MAGNESIUM: CPT

## 2023-06-15 PROCEDURE — 85730 THROMBOPLASTIN TIME PARTIAL: CPT

## 2023-06-15 PROCEDURE — 93458 L HRT ARTERY/VENTRICLE ANGIO: CPT

## 2023-06-15 PROCEDURE — 80061 LIPID PANEL: CPT

## 2023-06-15 PROCEDURE — 93005 ELECTROCARDIOGRAM TRACING: CPT

## 2023-06-15 PROCEDURE — 80053 COMPREHEN METABOLIC PANEL: CPT

## 2023-06-15 PROCEDURE — C1894: CPT

## 2023-06-15 PROCEDURE — 82553 CREATINE MB FRACTION: CPT

## 2023-06-19 DIAGNOSIS — I25.110 ATHEROSCLEROTIC HEART DISEASE OF NATIVE CORONARY ARTERY WITH UNSTABLE ANGINA PECTORIS: ICD-10-CM

## 2023-06-19 DIAGNOSIS — R94.39 ABNORMAL RESULT OF OTHER CARDIOVASCULAR FUNCTION STUDY: ICD-10-CM
